# Patient Record
Sex: FEMALE | Race: WHITE | Employment: PART TIME | URBAN - METROPOLITAN AREA
[De-identification: names, ages, dates, MRNs, and addresses within clinical notes are randomized per-mention and may not be internally consistent; named-entity substitution may affect disease eponyms.]

---

## 2022-05-31 RX ORDER — NORGESTIMATE AND ETHINYL ESTRADIOL 0.25-0.035
KIT ORAL
Qty: 28 TABLET | Refills: 0 | Status: SHIPPED | OUTPATIENT
Start: 2022-05-31 | End: 2022-06-07 | Stop reason: SDUPTHER

## 2022-06-07 ENCOUNTER — OFFICE VISIT (OUTPATIENT)
Dept: OBGYN CLINIC | Age: 32
End: 2022-06-07

## 2022-06-07 VITALS — WEIGHT: 192 LBS | HEIGHT: 68 IN | BODY MASS INDEX: 29.1 KG/M2

## 2022-06-07 DIAGNOSIS — Z12.4 PAP SMEAR FOR CERVICAL CANCER SCREENING: ICD-10-CM

## 2022-06-07 DIAGNOSIS — Z11.3 SCREEN FOR STD (SEXUALLY TRANSMITTED DISEASE): ICD-10-CM

## 2022-06-07 DIAGNOSIS — Z01.419 WELL WOMAN EXAM WITH ROUTINE GYNECOLOGICAL EXAM: Primary | ICD-10-CM

## 2022-06-07 PROCEDURE — 99395 PREV VISIT EST AGE 18-39: CPT | Performed by: OBSTETRICS & GYNECOLOGY

## 2022-06-07 RX ORDER — NORGESTIMATE AND ETHINYL ESTRADIOL 0.25-0.035
KIT ORAL
Qty: 84 TABLET | Refills: 5 | Status: SHIPPED | OUTPATIENT
Start: 2022-06-07

## 2022-06-07 ASSESSMENT — ENCOUNTER SYMPTOMS
EYES NEGATIVE: 1
RESPIRATORY NEGATIVE: 1
ALLERGIC/IMMUNOLOGIC NEGATIVE: 1
GASTROINTESTINAL NEGATIVE: 1

## 2022-06-07 NOTE — PROGRESS NOTES
Name: Tony Gage  Date: 2022  YOB: 1990  LMP: Patient's last menstrual period was 2022. [facility] MILLIE is a 29 y.o.   who is here for a annual exam.     Birth control: ocp  Menstrual status: regular cycles  Gyn Surgery: none    Health Maintenance:  Pap Smear: last pap in 3/17/22, pathology WNL  Mammo: never done  HPV vaccine: not done  Colonoscopy: not applicable  Dexa scan: not applicable    Abnormal Pap Follow Up 2022   Date of Pap 3/17/2022   Pap result WNL      Mammogram/Pap Hx 2022   Mammogram Result NA   Pap Date 3/17/2022   Pap Result WNL       Review of Systems   Constitutional: Negative. HENT: Negative. Eyes: Negative. Respiratory: Negative. Cardiovascular: Negative. Gastrointestinal: Negative. Endocrine: Negative. Genitourinary: Negative. Musculoskeletal: Negative. Skin: Negative. Allergic/Immunologic: Negative. Neurological: Negative. Hematological: Negative. Psychiatric/Behavioral: Negative. Negative for self-injury and suicidal ideas. Past Medical History:  No date: Vertigo     Past Surgical History:  No date: WISDOM TOOTH EXTRACTION       Current Outpatient Medications:     norgestimate-ethinyl estradiol (ORTHO-CYCLEN) 0.25-35 MG-MCG per tablet, TAKE 1 TABLET BY MOUTH EVERY DAY CONTINUOUSLY, Disp: 84 tablet, Rfl: 5    FLUoxetine (PROZAC) 40 MG capsule, 60 mg TAKE 1 CAPSULE BY MOUTH EVERYDAY AT BEDTIME, Disp: , Rfl:     traZODone (DESYREL) 50 MG tablet, Take by mouth, Disp: , Rfl:     lamoTRIgine (LAMICTAL XR) 50 MG extended release tablet, Take 50 mg by mouth (Patient not taking: Reported on 2022), Disp: , Rfl:     Family Cancer History:   Cancer-related family history includes Cancer in her paternal grandmother; Colon Cancer in her father. There is no history of Ovarian Cancer or Breast Cancer. Social History:  reports that she has never smoked.  She has never used smokeless tobacco. She reports that she does not drink alcohol and does not use drugs. Ob History:  No obstetric history on file. Sexual History:  reports being sexually active and has had partner(s) who are male. She reports using the following method of birth control/protection: Pill. PHYSICAL EXAM:  Vitals:  height is 5' 8\" (1.727 m) and weight is 192 lb (87.1 kg). Body mass index is 29.19 kg/m². Physical Exam  Constitutional:       Appearance: Normal appearance. HENT:      Head: Normocephalic and atraumatic. Eyes:      Conjunctiva/sclera: Conjunctivae normal.   Neck:      Thyroid: No thyroid mass. Cardiovascular:      Rate and Rhythm: Normal rate. Pulmonary:      Effort: Pulmonary effort is normal.   Abdominal:      Palpations: Abdomen is soft. There is no hepatomegaly, splenomegaly or pulsatile mass. Tenderness: There is no right CVA tenderness, left CVA tenderness, guarding or rebound. Musculoskeletal:         General: Normal range of motion. Lymphadenopathy:      Cervical: No cervical adenopathy. Skin:     General: Skin is warm and dry. Neurological:      General: No focal deficit present. Mental Status: She is alert and oriented to person, place, and time. Psychiatric:         Attention and Perception: Attention and perception normal.         Speech: Speech normal.         Behavior: Behavior normal.         Cognition and Memory: Cognition normal.        Breast exam is normal in right and left breast without masses, lesions or discharge. The external genitalia is normal.  Urethral meatus is normal. Vagina is pink and rugated. The bladder and urethra are normal .  The cervix is normal.  The uterus is normal. The ovaries are normal.       Assessment: 29 y.o. , No obstetric history on file. , here for annual exam  1. well woman: pap is done  2.   pregnancy prevention: david Thompson MD

## 2022-06-08 LAB
HCV AB SER QL: NONREACTIVE
HIV 1+2 AB+HIV1 P24 AG SERPL QL IA: NONREACTIVE
HIV 1/2 RESULT COMMENT: NORMAL
RPR SER QL: NONREACTIVE

## 2022-06-10 LAB
C TRACH RRNA CVX QL NAA+PROBE: NEGATIVE
CYTOLOGIST CVX/VAG CYTO: NORMAL
CYTOLOGY CVX/VAG DOC THIN PREP: NORMAL
HPV REFLEX: NORMAL
Lab: NORMAL
N GONORRHOEA RRNA CVX QL NAA+PROBE: NEGATIVE
PATH REPORT.FINAL DX SPEC: NORMAL
STAT OF ADQ CVX/VAG CYTO-IMP: NORMAL
T VAGINALIS RRNA SPEC QL NAA+PROBE: NEGATIVE

## 2022-06-23 RX ORDER — NORGESTIMATE AND ETHINYL ESTRADIOL 0.25-0.035
KIT ORAL
Qty: 28 TABLET | OUTPATIENT
Start: 2022-06-23